# Patient Record
Sex: MALE | Race: BLACK OR AFRICAN AMERICAN | NOT HISPANIC OR LATINO | Employment: UNEMPLOYED | URBAN - METROPOLITAN AREA
[De-identification: names, ages, dates, MRNs, and addresses within clinical notes are randomized per-mention and may not be internally consistent; named-entity substitution may affect disease eponyms.]

---

## 2023-01-04 ENCOUNTER — TELEPHONE (OUTPATIENT)
Dept: PSYCHIATRY | Facility: CLINIC | Age: 33
End: 2023-01-04
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
## 2023-01-24 ENCOUNTER — SOCIAL WORK (OUTPATIENT)
Dept: BEHAVIORAL/MENTAL HEALTH CLINIC | Facility: CLINIC | Age: 33
End: 2023-01-24

## 2023-01-24 DIAGNOSIS — F43.9 TRAUMA AND STRESSOR-RELATED DISORDER: Primary | ICD-10-CM

## 2023-01-24 DIAGNOSIS — F32.A DEPRESSION, UNSPECIFIED DEPRESSION TYPE: ICD-10-CM
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
## 2023-01-25 ENCOUNTER — TELEPHONE (OUTPATIENT)
Dept: PSYCHIATRY | Facility: CLINIC | Age: 33
End: 2023-01-25

## 2023-01-25 NOTE — TELEPHONE ENCOUNTER
Pt called looking for med mgmt services  I informed pt that when he sees his therapist next to have them put in a referral and someone from the intake dept will reach out to get him scheduled

## 2023-02-07 ENCOUNTER — SOCIAL WORK (OUTPATIENT)
Dept: BEHAVIORAL/MENTAL HEALTH CLINIC | Facility: CLINIC | Age: 33
End: 2023-02-07

## 2023-02-07 DIAGNOSIS — F43.9 TRAUMA AND STRESSOR-RELATED DISORDER: ICD-10-CM

## 2023-02-07 DIAGNOSIS — F32.A DEPRESSION, UNSPECIFIED DEPRESSION TYPE: Primary | ICD-10-CM

## 2023-02-08 NOTE — PSYCH
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  Pt gets frustrated with maintaining relationships due to trust issues  Pt has seen family, friends and acquaintances change around him as he does well  Pt is quick to anger and wants to do more, to control his mood  During this session, this clinician used the following therapeutic modalities: Client-centered Therapy, Cognitive Behavioral Therapy and Motivational Interviewing    Substance Abuse was not addressed during this session  If the client is diagnosed with a co-occurring substance use disorder, please indicate any changes in the frequency or amount of use: na  Stage of change for addressing substance use diagnoses: No substance use/Not applicable    ASSESSMENT:  Arina Munoz presents with a Euthymic/ normal mood  his affect is Normal range and intensity, which is congruent, with his mood and the content of the session  The client has made progress on their goals  Liz Ramirez did not present as a danger to self or others  Arina Munoz presents with a none risk of suicide, none risk of self-harm, and none risk of harm to others  For any risk assessment that surpasses a "low" rating, a safety plan must be developed  A safety plan was indicated: no  If yes, describe in detail na    PLAN: Between sessions, Arina Munoz will reflect on his intrusive/negative thinking  Work to categorize, as best he can, the "buckets" that his various over-thinking falls into (I e  what themes/common denominators exist in his thinking)  At the next session, the therapist will use Client-centered Therapy and Cognitive Behavioral Therapy to address where the client is at      Behavioral Health Treatment Plan and Discharge Planning: Sánchez Beal is aware of and agrees to continue to work on their treatment plan  They have identified and are working toward their discharge goals  yes - Goals have been discussed, however both sessions have run long due to Pt's need to decompress his trauma and depression  Tx plan and Initial Eval details will be revisited on 2/21/23      Visit start and stop times:    02/08/23  Start Time: 1105  Stop Time: 1205  Total Visit Time: 60 minutes

## 2023-02-21 ENCOUNTER — SOCIAL WORK (OUTPATIENT)
Dept: BEHAVIORAL/MENTAL HEALTH CLINIC | Facility: CLINIC | Age: 33
End: 2023-02-21

## 2023-02-21 DIAGNOSIS — F32.A DEPRESSION, UNSPECIFIED DEPRESSION TYPE: Primary | ICD-10-CM

## 2023-02-21 DIAGNOSIS — F43.9 TRAUMA AND STRESSOR-RELATED DISORDER: ICD-10-CM

## 2023-02-21 NOTE — PSYCH
Behavioral Health Psychotherapy Progress Note    Psychotherapy Provided: Individual Psychotherapy     1  Depression, unspecified depression type        2  Trauma and stressor-related disorder            Goals addressed in session: Goal 1     DATA: Brodie Lopez was attentive, and engaged during the session  He mentioned again, his interest in being formally assessed for ADHD  Pt described how his mind wanders and that then triggers his anxiety when talking to people, as he loses tract of the conversation and becomes anxious when he doesn't know what to say  Pt talked about his trouble maintaining relationships, as everyone seems to want something from him  Pt mentioned his trust issues as a result of bad past experiences  During this session, this clinician used the following therapeutic modalities: Client-centered Therapy, Cognitive Behavioral Therapy, Mindfulness-based Strategies and Supportive Psychotherapy    Substance Abuse was not addressed during this session  If the client is diagnosed with a co-occurring substance use disorder, please indicate any changes in the frequency or amount of use: na  Stage of change for addressing substance use diagnoses: No substance use/Not applicable    ASSESSMENT:  Angle Strickland presents with a Euthymic/ normal mood  his affect is Normal range and intensity, which is congruent, with his mood and the content of the session  The client has made progress on their goals  Brodie Lopez did not present as a danger to self or others  Angle Strickland presents with a none risk of suicide, none risk of self-harm, and none risk of harm to others  For any risk assessment that surpasses a "low" rating, a safety plan must be developed  A safety plan was indicated: no  If yes, describe in detail na    PLAN: Between sessions, Angle Strickland will continue to reflect on his thought processes and communication stye   As we as evaluate his Fight, Flight and/or Freeze responses through an anxiety lens  At the next session, the therapist will use Client-centered Therapy and Cognitive Behavioral Therapy to address where the pt is at  Behavioral Health Treatment Plan and Discharge Planning: Isabelle Johnson is aware of and agrees to continue to work on their treatment plan  They have identified and are working toward their discharge goals   yes    Visit start and stop times:    02/21/23  Start Time: 1100  Stop Time: 1205  Total Visit Time: 65 minutes

## 2023-03-07 ENCOUNTER — SOCIAL WORK (OUTPATIENT)
Dept: BEHAVIORAL/MENTAL HEALTH CLINIC | Facility: CLINIC | Age: 33
End: 2023-03-07

## 2023-03-07 DIAGNOSIS — F43.9 TRAUMA AND STRESSOR-RELATED DISORDER: ICD-10-CM

## 2023-03-07 DIAGNOSIS — F32.A DEPRESSION, UNSPECIFIED DEPRESSION TYPE: Primary | ICD-10-CM

## 2023-03-07 NOTE — PSYCH
Behavioral Health Psychotherapy Progress Note    Psychotherapy Provided: Individual Psychotherapy     1  Depression, unspecified depression type        2  Trauma and stressor-related disorder            Goals addressed in session: Goal 1     DATA: Lexi Rust was in good spirits  Discussion revolved mainly around navigating his relationships  He perceives other people's motives through his trauma and has a lot of anxiety about how to manage other people's expectations, and struggles with trust    During this session, this clinician used the following therapeutic modalities: Client-centered Therapy, Cognitive Behavioral Therapy and Motivational Interviewing    Substance Abuse was not addressed during this session  If the client is diagnosed with a co-occurring substance use disorder, please indicate any changes in the frequency or amount of use: na  Stage of change for addressing substance use diagnoses: No substance use/Not applicable    ASSESSMENT:  Herminio Boateng presents with a Euthymic/ normal mood  his affect is Normal range and intensity, which is congruent, with his mood and the content of the session  The client has made progress on their goals  Lexi Rust did not present as a danger to self or others  Herminio Boateng presents with a minimal risk of suicide, minimal risk of self-harm, and minimal risk of harm to others  For any risk assessment that surpasses a "low" rating, a safety plan must be developed  A safety plan was indicated: no  If yes, describe in detail na    PLAN: Between sessions, Herminio Boateng will reflect on his next steps in life  He has many plans and needs to work on goal setting  At the next session, the therapist will use Client-centered Therapy to address where the pt is at  Behavioral Health Treatment Plan and Discharge Planning: Herminio Boateng is aware of and agrees to continue to work on their treatment plan   They have identified and are working toward their discharge goals   yes    Visit start and stop times:    03/07/23  Start Time: 1100  Stop Time: 1205  Total Visit Time: 65 minutes

## 2023-03-16 ENCOUNTER — TELEMEDICINE (OUTPATIENT)
Dept: PSYCHIATRY | Facility: CLINIC | Age: 33
End: 2023-03-16

## 2023-03-16 ENCOUNTER — TELEPHONE (OUTPATIENT)
Dept: PSYCHIATRY | Facility: CLINIC | Age: 33
End: 2023-03-16

## 2023-03-16 DIAGNOSIS — F31.9 BIPOLAR AFFECTIVE DISORDER, REMISSION STATUS UNSPECIFIED (HCC): Primary | ICD-10-CM

## 2023-03-16 DIAGNOSIS — F12.10 CANNABIS ABUSE: ICD-10-CM

## 2023-03-16 RX ORDER — LAMOTRIGINE 25 MG/1
25 TABLET ORAL DAILY
Qty: 30 TABLET | Refills: 1 | Status: SHIPPED | OUTPATIENT
Start: 2023-03-16

## 2023-03-16 RX ORDER — BUPROPION HYDROCHLORIDE 300 MG/1
300 TABLET ORAL DAILY
COMMUNITY
Start: 2023-02-24

## 2023-03-16 RX ORDER — PANTOPRAZOLE SODIUM 40 MG/1
40 TABLET, DELAYED RELEASE ORAL DAILY
COMMUNITY
Start: 2023-01-23

## 2023-03-16 NOTE — PATIENT INSTRUCTIONS
ADHD - neuropsychological testing   Considering h/o severe drug abuse, NO stimulants  - if dx of ADHD is confirmed - only strattera or wellbutrin  I explained long term effects on MJ use - anxiety, cognitive impairment - recommended to cut down - pt is not very interested    Continue wellbutrin by PCO  Add lamictal 25 mg qd

## 2023-03-16 NOTE — TELEPHONE ENCOUNTER
"Good Morning Daphne Dario- We are looking forward to your appointment today  There is necessary paperwork that must be read, signed and completed prior to your Virtual Visit with Dr Nat Macias today, 3/16/2023, at 5:00pm   We will check back 30-60 minutes prior to your appointment to make sure the paperwork is complete; otherwise, we may have to temporarily cancel this appointment until the paperwork is completed and signed  Please call us at [phone] if you need assistance- we are here to help  You can sign the paperwork by registering for [employer]), and completing the forms online digitally, or you can print the attached document, complete, sign and resend it to us through email or fax  You can reply to this email and attach file, or fax it to us at [phone]  If you decide to fax or email, please also send a copy of your ’s license so we can confirm your identity  Again, please call us at [phone] if you have any questions or concerns  If you are having trouble locating your documents in [employer]), please see the attached how to [facility] guide  If you need help setting up [employer]), instructions on how to register are also attached  You can also call [phone] ([phone]), select option 5, or visit our [address] website online at [facility]/[employer] and they will have a specialist walk you through the steps      "
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
## 2023-04-04 ENCOUNTER — SOCIAL WORK (OUTPATIENT)
Dept: BEHAVIORAL/MENTAL HEALTH CLINIC | Facility: CLINIC | Age: 33
End: 2023-04-04

## 2023-04-04 DIAGNOSIS — F32.A DEPRESSION, UNSPECIFIED DEPRESSION TYPE: Primary | ICD-10-CM

## 2023-04-04 DIAGNOSIS — F43.9 TRAUMA AND STRESSOR-RELATED DISORDER: ICD-10-CM

## 2023-04-04 NOTE — PSYCH
"Behavioral Health Psychotherapy Progress Note    Psychotherapy Provided: Individual Psychotherapy     1  Depression, unspecified depression type        2  Trauma and stressor-related disorder            Goals addressed in session: Goal 1     DATA: Nik Garcia was connected to Psychiatry and disclosed he is taking his meds regularly, and has already seen an effect  He states that he is less anxious, his thoughts are \"clearer\" and his \"emotions are stabilized\"  Nik Garcia agreed that he would like to work on communication skills, being assertive, and learning how to navigate difficult conversations and relationships  During this session, this clinician used the following therapeutic modalities: Client-centered Therapy, Cognitive Behavioral Therapy and Mindfulness-based Strategies    Substance Abuse was not addressed during this session  If the client is diagnosed with a co-occurring substance use disorder, please indicate any changes in the frequency or amount of use: na  Stage of change for addressing substance use diagnoses: No substance use/Not applicable    ASSESSMENT:  Varun Mittal presents with a Euthymic/ normal mood  his affect is Normal range and intensity, which is congruent, with his mood and the content of the session  The client has made progress on their goals  Nik Garcia did not present as a danger to self or others  Varun Mittal presents with a minimal risk of suicide, minimal risk of self-harm, and minimal risk of harm to others  For any risk assessment that surpasses a \"low\" rating, a safety plan must be developed  A safety plan was indicated: no  If yes, describe in detail na    PLAN: Between sessions, Varun Mittal will reflect on circumstances  At the next session, the therapist will use Client-centered Therapy to address where the client is at      Behavioral Health Treatment Plan and Discharge Planning: Varun Mittal is aware of and agrees to continue to work on their treatment " plan  They have identified and are working toward their discharge goals   yes    Visit start and stop times:    04/05/23  Start Time: 1100  Stop Time: 1152  Total Visit Time: 52 minutes

## 2023-04-04 NOTE — BH TREATMENT PLAN
[address]  1990     Date of Initial Psychotherapy Assessment: 4/4/23   Date of Current Treatment Plan: 04/05/23  Treatment Plan Target Date: TBD  Treatment Plan Expiration Date: 10/24/23    Diagnosis:   1  Depression, unspecified depression type        2  Trauma and stressor-related disorder            Area(s) of Need: Coping skill set  Establishing/Maintaining healthy boundaries  Long Term Goal 1 (in the client's own words): Self improvement  Better communication  Understanding my emotions    Stage of Change: Action    Target Date for completion: TBD     Anticipated therapeutic modalities: CBT  Mindfulness  Positive Psychology  Client Centered Therapy  People identified to complete this goal: Lucia Jones      Objective 1: (identify the means of measuring success in meeting the objective): Bringing out the best version of my self in all aspects  Objective 2: (identify the means of measuring success in meeting the objective): Healthier conversations  Better experiences with people  I am currently under the care of a [facility] psychiatric provider: yes    My [facility] psychiatric provider is: Attila Minus am currently taking psychiatric medications: Yes, as prescribed    I feel that I will be ready for discharge from mental health care when I reach the following (measurable goal/objective): See goals above  For children and adults who have a legal guardian:   Has there been any change to custody orders and/or guardianship status? NA  If yes, attach updated documentation  I have created my Crisis Plan and have been offered a copy of this plan    [address]: Diagnosis and Treatment Plan explained to Robbi Saldana acknowledges an understanding of their diagnosis  Lucia Jones agrees to this treatment plan      I have been offered a copy of this Treatment Plan  yes

## 2023-04-04 NOTE — BH CRISIS PLAN
"Client Name: Denisse Aguirre       Client YOB: 1990  : 1990    Treatment Team (include name and contact information):     Psychotherapist: Stevan Rojsa    Psychiatrist: Duran Gloria   Release of information completed: no    \" na   Release of information completed: no    Healthcare Provider  No primary care provider on file  No primary provider on file  Type of Plan   * Child plans (children 15 yo and younger) must be completed and signed by the child's legal guardian   * Plans for all individuals 15 yo and above must be signed by the client  Plan Type: adolescent/adult (14 and over) Initial      My Personal Strengths are (in the client's own words):  Self dependent determined diligent    The stressors and triggers that may put me at risk are:  other (describe) Betrayal, Verabal abuse    Coping skills I can use to keep myself calm and safe:  [place]/meditate, Smoke  Coping skills/supports I can use to maintain abstinence from substance use:   na    The people that provide me with help and support: (Include name, contact, and how they can help)   Support person #1: Mom    * Phone number: [phone]    * How can they help me? Every way possible  In the past, the following has helped me in times of crisis:    Calling a friend, Calling a family member, Praying or meditating and Listening to music      If it is an emergency and you need immediate help, call     If there is a possibility of danger to yourself or others, call the following crisis hotline resources:     Adult Crisis Numbers  Suicide Prevention Hotline - Dial   [facility]: [address]: [address]: [address]: [address] [place] [address] Avenue: [address] [place] [address] Street: [address] [place] [address] Avenue: [address] [place] [address] St: [phone] (daytime)         [phone] (after hours, weekends, " holidays)     Child/Adolescent Crisis Numbers   [facility]: Julianna Hurtado 10: [phone]   [place]: [phone]   [address] [place] [address] ([facility]): [phone]    Please note: Some counties do not have a separate number for Child/Adolescent specific crisis  If your county is not listed under Child/Adolescent, please call the adult number for your county     National Talk to Text Line   All Ages - [phone]    In the event your feelings become unmanageable, and you cannot reach your support system, you will call 911 immediately or go to the nearest hospital emergency room

## 2023-05-09 ENCOUNTER — SOCIAL WORK (OUTPATIENT)
Dept: BEHAVIORAL/MENTAL HEALTH CLINIC | Facility: CLINIC | Age: 33
End: 2023-05-09

## 2023-05-09 DIAGNOSIS — F43.9 TRAUMA AND STRESSOR-RELATED DISORDER: ICD-10-CM

## 2023-05-09 DIAGNOSIS — F32.A DEPRESSION, UNSPECIFIED DEPRESSION TYPE: Primary | ICD-10-CM

## 2023-05-09 NOTE — PSYCH
"Behavioral Health Psychotherapy Progress Note    Psychotherapy Provided: Individual Psychotherapy     1  Depression, unspecified depression type        2  Trauma and stressor-related disorder            Goals addressed in session: Goal 1     DATA: Brian Francis mentioned his ongoing struggles with trusting the family and friends that he grew up with  Stating he's been \"hurt\" too often  During this session, this clinician used the following therapeutic modalities: Client-centered Therapy, Mindfulness-based Strategies and Motivational Interviewing    Substance Abuse was addressed during this session  If the client is diagnosed with a co-occurring substance use disorder, please indicate any changes in the frequency or amount of use: na  Stage of change for addressing substance use diagnoses: Maintenance    ASSESSMENT:  Katarzyna Mcclelland presents with a Euthymic/ normal mood  his affect is Normal range and intensity, which is congruent, with his mood and the content of the session  The client has made progress on their goals  Brian Francis did not present as a danger to self or others  Katarzyna Mcclelland presents with a minimal risk of suicide, minimal risk of self-harm, and minimal risk of harm to others  For any risk assessment that surpasses a \"low\" rating, a safety plan must be developed  A safety plan was indicated: no  If yes, describe in detail na    PLAN: Between sessions, Katarzyna Mcclelland will reflect on his goals, boundaries and barriers to self-care  At the next session, the therapist will use Client-centered Therapy to address where the client is at  Behavioral Health Treatment Plan and Discharge Planning: [facility] is aware of and agrees to continue to work on their treatment plan  They have identified and are working toward their discharge goals   yes    Visit start and stop times:    05/09/23  Start Time: 1105  Stop Time: 1155  Total Visit Time: 50 minutes  "

## 2023-05-23 ENCOUNTER — TELEMEDICINE (OUTPATIENT)
Dept: PSYCHIATRY | Facility: CLINIC | Age: 33
End: 2023-05-23

## 2023-05-23 DIAGNOSIS — F12.10 CANNABIS ABUSE: ICD-10-CM

## 2023-05-23 DIAGNOSIS — F31.9 BIPOLAR AFFECTIVE DISORDER, REMISSION STATUS UNSPECIFIED (HCC): Primary | ICD-10-CM

## 2023-05-23 RX ORDER — LAMOTRIGINE 25 MG/1
50 TABLET ORAL DAILY
Qty: 60 TABLET | Refills: 2 | Status: SHIPPED | OUTPATIENT
Start: 2023-05-23

## 2023-05-23 NOTE — PSYCH
"  Virtual Regular Visit    Verification of patient location:    Patient is located at Home in the following state in which I hold an active license [place]      Assessment/Plan:    Problem List Items Addressed This Visit    None      Goals addressed in session: Goal 1          Reason for visit is   Chief Complaint   Patient presents with   • Medication Management        Encounter provider Dena Snow MD    Provider located at [id number] [facility] [address]  [address]  [phone]      Recent Visits  No visits were found meeting these conditions  Showing recent visits within past 7 days and meeting all other requirements  Today's Visits  Date Type Provider Dept   05/23/23 Telemedicine Dena Snow, [address] [address] today's visits and meeting all other requirements  Future Appointments  No visits were found meeting these conditions  Showing future appointments within next 150 days and meeting all other requirements       The patient was identified by name and date of birth  Miladis Reid was informed that this is a telemedicine visit and that the visit is being conducted throughthe [facility]  He agrees to proceed     My office door was closed  No one else was in the room  He acknowledged consent and understanding of privacy and security of the video platform  The patient has agreed to participate and understands they can discontinue the visit at any time  Patient is aware this is a billable service  Fabian Jose Elias is a 33 y o  male with bipolar do presents for regular f/u   He looks \" high\" - was smoking weed just prior appt  He reports being compliant with meds   HPI   Mood - reports as \" good\"  - denies mood swings or depression  Anxiety - controlled; denies panic attacks or racing thoughts    History reviewed  No pertinent past medical history  History reviewed   No pertinent " surgical history  Current Outpatient Medications   Medication Sig Dispense Refill   • buPROPion (WELLBUTRIN XL) 300 mg 24 hr tablet Take 300 mg by mouth daily     • lamoTRIgine (LaMICtal) 25 mg tablet Take 2 tablets (50 mg total) by mouth daily 60 tablet 1   • pantoprazole (PROTONIX) 40 mg tablet Take 40 mg by mouth daily       No current facility-administered medications for this visit  Not on File    Review of Systems   Constitutional: Negative for activity change and appetite change  Psychiatric/Behavioral: Negative for dysphoric mood, sleep disturbance and suicidal ideas  The patient is not nervous/anxious  Video Exam    There were no vitals filed for this visit  Physical Exam  Constitutional:       Appearance: Normal appearance  He is normal weight  Comments: High on weed   Neurological:      Mental Status: He is alert  Psychiatric:         Attention and Perception: Attention and perception normal          Mood and Affect: Affect normal  Mood is elated  Speech: Speech normal          Behavior: Behavior normal  Behavior is cooperative  Thought Content:  Thought content normal          Judgment: Judgment normal       Comments: High on weed          Visit Time    Visit Start Time: 3 58 pm   Visit Stop Time: 4 06 pm   Total Visit Duration: 15 minutes

## 2023-06-16 ENCOUNTER — TELEPHONE (OUTPATIENT)
Dept: PSYCHIATRY | Facility: CLINIC | Age: 33
End: 2023-06-16

## 2023-06-16 NOTE — TELEPHONE ENCOUNTER
Called patient to cancel appt for 6/20 because provider is out of town  Pt will koffi ricky to reschedule

## 2023-06-28 DIAGNOSIS — F31.9 BIPOLAR AFFECTIVE DISORDER, REMISSION STATUS UNSPECIFIED (HCC): Primary | ICD-10-CM

## 2023-06-28 RX ORDER — PANTOPRAZOLE SODIUM 40 MG/1
40 TABLET, DELAYED RELEASE ORAL DAILY
Qty: 30 TABLET | Refills: 1 | Status: SHIPPED | OUTPATIENT
Start: 2023-06-28

## 2023-06-28 RX ORDER — BUPROPION HYDROCHLORIDE 300 MG/1
300 TABLET ORAL DAILY
Qty: 30 TABLET | Refills: 1 | Status: SHIPPED | OUTPATIENT
Start: 2023-06-28

## 2023-07-25 ENCOUNTER — SOCIAL WORK (OUTPATIENT)
Dept: BEHAVIORAL/MENTAL HEALTH CLINIC | Facility: CLINIC | Age: 33
End: 2023-07-25
Payer: COMMERCIAL

## 2023-07-25 DIAGNOSIS — F43.9 TRAUMA AND STRESSOR-RELATED DISORDER: ICD-10-CM

## 2023-07-25 DIAGNOSIS — F32.A DEPRESSION, UNSPECIFIED DEPRESSION TYPE: Primary | ICD-10-CM

## 2023-07-25 PROCEDURE — 90834 PSYTX W PT 45 MINUTES: CPT

## 2023-07-25 NOTE — PSYCH
Behavioral Health Psychotherapy Progress Note    Psychotherapy Provided: Individual Psychotherapy     1. Depression, unspecified depression type        2. Trauma and stressor-related disorder            Goals addressed in session: Goal 1     DATA: Saw son from [place], had to go back. Was tough. Leaning towards clothing, pausing music. Too violent, not enough money. Starting a new job. Telemarketing, selling solar panels. Having difficulties with family/brothers helping with career and role confusion. GF checked in to [facility]. PT was supportive. Discussed the loss of music laptop. Spilled water on it. Tough loss. During this session, this clinician used the following therapeutic modalities: Client-centered Therapy    Substance Abuse was not addressed during this session. If the client is diagnosed with a co-occurring substance use disorder, please indicate any changes in the frequency or amount of use: na. Stage of change for addressing substance use diagnoses: No substance use/Not applicable    ASSESSMENT:  Marie Torres presents with a Euthymic/ normal mood. his affect is Normal range and intensity, which is congruent, with his mood and the content of the session. The client has made progress on their goals. Earnesteen Dys did not present as a danger to self or others. Marie Torres presents with a minimal risk of suicide, minimal risk of self-harm, and minimal risk of harm to others. For any risk assessment that surpasses a "low" rating, a safety plan must be developed. A safety plan was indicated: no  If yes, describe in detail na    PLAN: Between sessions, Marie Torres will work on maintaining healthy boundaries. At the next session, the therapist will use Client-centered Therapy to address where the client is at. Behavioral Health Treatment Plan and Discharge Planning: Marie Torres is aware of and agrees to continue to work on their treatment plan.  They have identified and are working toward their discharge goals.  yes    Visit start and stop times:    07/25/23  Start Time: 1006  Stop Time: 1058  Total Visit Time: 52 minutes

## 2023-08-31 ENCOUNTER — TELEMEDICINE (OUTPATIENT)
Dept: PSYCHIATRY | Facility: CLINIC | Age: 33
End: 2023-08-31
Payer: COMMERCIAL

## 2023-08-31 DIAGNOSIS — F31.9 BIPOLAR AFFECTIVE DISORDER, REMISSION STATUS UNSPECIFIED (HCC): Primary | ICD-10-CM

## 2023-08-31 DIAGNOSIS — F12.10 CANNABIS ABUSE: ICD-10-CM

## 2023-08-31 PROCEDURE — 99214 OFFICE O/P EST MOD 30 MIN: CPT | Performed by: PSYCHIATRY & NEUROLOGY

## 2023-08-31 RX ORDER — LAMOTRIGINE 100 MG/1
100 TABLET ORAL DAILY
Qty: 30 TABLET | Refills: 1 | Status: SHIPPED | OUTPATIENT
Start: 2023-08-31

## 2023-08-31 RX ORDER — BUPROPION HYDROCHLORIDE 300 MG/1
300 TABLET ORAL DAILY
Qty: 30 TABLET | Refills: 1 | Status: SHIPPED | OUTPATIENT
Start: 2023-08-31

## 2023-08-31 NOTE — PSYCH
Virtual Regular Visit    Verification of patient location:    Patient is located at Home in the following state in which I hold an active license [place]      Assessment/Plan:    Problem List Items Addressed This Visit    None      Goals addressed in session: Goal 1          Reason for visit is   Chief Complaint   Patient presents with   • Medication Management        Encounter provider Anthony Berger MD    Provider located at [address] [place] [address],[address]  [address]  [phone]      Recent Visits  No visits were found meeting these conditions. Showing recent visits within past 7 days and meeting all other requirements  Today's Visits  Date Type Provider Dept   08/31/23 Telemedicine Anthony Berger, [address] today's visits and meeting all other requirements  Future Appointments  No visits were found meeting these conditions. Showing future appointments within next 150 days and meeting all other requirements       The patient was identified by name and date of birth. John Dailey was informed that this is a telemedicine visit and that the visit [facility]. He agrees to proceed. .  My office door was closed. No one else was in the room. He acknowledged consent and understanding of privacy and security of the video platform. The patient has agreed to participate and understands they can discontinue the visit at any time. Patient is aware this is a billable service. Subjective  John Dailey is a 29 y.o. male with bipolar do presents for regular f/u   compliant with meds. 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       HPI   Mood -pt is talkative, but poor historian; needs redirections and interruptions  He reports feeling " good" and sometimes getting frustrated of " things not done", which make him depressed, hopeless, low self esteem, worthless, negative intrusive thoughts  Gets more energy at night, creative, stays up late and sleeps 3-4 hrs  Less angry  Anxiety - racing thoughts and worries   History reviewed. No pertinent past medical history. History reviewed. No pertinent surgical history. Current Outpatient Medications   Medication Sig Dispense Refill   • buPROPion (WELLBUTRIN XL) 300 mg 24 hr tablet Take 1 tablet (300 mg total) by mouth daily 30 tablet 1   • lamoTRIgine (LaMICtal) 25 mg tablet Take 2 tablets (50 mg total) by mouth daily 60 tablet 2   • pantoprazole (PROTONIX) 40 mg tablet Take 1 tablet (40 mg total) by mouth daily 30 tablet 1     No current facility-administered medications for this visit. Not on File    Review of Systems   Constitutional: Negative for activity change and appetite change. Psychiatric/Behavioral: Positive for dysphoric mood and sleep disturbance. Negative for suicidal ideas. The patient is nervous/anxious. Video Exam    There were no vitals filed for this visit. Physical Exam  Constitutional:       Appearance: Normal appearance. He is normal weight. Neurological:      Mental Status: He is alert. Psychiatric:         Attention and Perception: Attention and perception normal.         Mood and Affect: Mood is anxious. Affect is blunt. Behavior: Behavior normal. Behavior is cooperative. Thought Content:  Thought content normal.         Judgment: Judgment normal.      Comments: Talkative, but poor historian  Needs interruptions and redirections  Circumstantial and sometimes irrelevant          Visit Time    Visit Start Time: 2.58 pm   Visit Stop Time: 3.06 pm   Total Visit Duration: 17 minutes

## 2023-09-11 DIAGNOSIS — F31.9 BIPOLAR AFFECTIVE DISORDER, REMISSION STATUS UNSPECIFIED (HCC): ICD-10-CM

## 2023-09-11 RX ORDER — PANTOPRAZOLE SODIUM 40 MG/1
40 TABLET, DELAYED RELEASE ORAL DAILY
Qty: 30 TABLET | Refills: 1 | OUTPATIENT
Start: 2023-09-11

## 2023-09-12 ENCOUNTER — SOCIAL WORK (OUTPATIENT)
Dept: BEHAVIORAL/MENTAL HEALTH CLINIC | Facility: CLINIC | Age: 33
End: 2023-09-12
Payer: COMMERCIAL

## 2023-09-12 DIAGNOSIS — F43.9 TRAUMA AND STRESSOR-RELATED DISORDER: ICD-10-CM

## 2023-09-12 DIAGNOSIS — F32.A DEPRESSION, UNSPECIFIED DEPRESSION TYPE: Primary | ICD-10-CM

## 2023-09-12 PROCEDURE — 90834 PSYTX W PT 45 MINUTES: CPT

## 2023-09-12 NOTE — PSYCH
Behavioral Health Psychotherapy Progress Note    Psychotherapy Provided: Individual Psychotherapy     1. Depression, unspecified depression type        2. Trauma and stressor-related disorder            Goals addressed in session: Goal 1     DATA: Tu Been expressed his frustrations with his situation. Finances are a concern. Pt shared his struggles with anger and his history using anger as a survival skill. During this session, this clinician used the following therapeutic modalities: Client-centered Therapy, Mindfulness-based Strategies and Solution-Focused Therapy    Substance Abuse was addressed during this session. If the client is diagnosed with a co-occurring substance use disorder, please indicate any changes in the frequency or amount of use: na. Stage of change for addressing substance use diagnoses: No substance use/Not applicable    ASSESSMENT:  Umesh Broussard presents with a Angry and Anxious mood. his affect is Normal range and intensity, which is congruent, with his mood and the content of the session. The client has not made progress on their goals. Tu Been did not present as a danger to self or others, however topic of today was his anger management, and his increasing frustration. Pt noted he sometimes feels like he's going to lose control. Umesh Broussard presents with a minimal risk of suicide, minimal risk of self-harm, and minimal risk of harm to others. For any risk assessment that surpasses a "low" rating, a safety plan must be developed. A safety plan was indicated: no  If yes, describe in detail na    PLAN: Between sessions, Umesh Broussard will work on identifying goals and problem solving. At the next session, the therapist will use Client-centered Therapy to address where the client is at. Behavioral Health Treatment Plan and Discharge Planning: Umesh Broussard is aware of and agrees to continue to work on their treatment plan.  They have identified and are working toward their discharge goals.  yes    Visit start and stop times:    09/12/23  Start Time: 1102  Stop Time: 1154  Total Visit Time: 52 minutes

## 2023-11-08 ENCOUNTER — TELEPHONE (OUTPATIENT)
Dept: PSYCHIATRY | Facility: CLINIC | Age: 33
End: 2023-11-08

## 2023-11-08 NOTE — TELEPHONE ENCOUNTER
Clt calling to schedule f/u with Janusz and Dr. Kiley Mayes. Scheduled client with Alec Nolan on 11/14 at 10am in person and transferred call to [facility] to assist with scheduling with Dr. Kiley Mayes.

## 2023-11-13 ENCOUNTER — TELEMEDICINE (OUTPATIENT)
Dept: PSYCHIATRY | Facility: CLINIC | Age: 33
End: 2023-11-13
Payer: COMMERCIAL

## 2023-11-13 DIAGNOSIS — G47.00 INSOMNIA, UNSPECIFIED TYPE: Primary | ICD-10-CM

## 2023-11-13 DIAGNOSIS — F31.9 BIPOLAR AFFECTIVE DISORDER, REMISSION STATUS UNSPECIFIED (HCC): ICD-10-CM

## 2023-11-13 PROCEDURE — 99214 OFFICE O/P EST MOD 30 MIN: CPT | Performed by: PSYCHIATRY & NEUROLOGY

## 2023-11-13 RX ORDER — LAMOTRIGINE 100 MG/1
100 TABLET ORAL DAILY
Qty: 30 TABLET | Refills: 1 | Status: SHIPPED | OUTPATIENT
Start: 2023-11-13

## 2023-11-13 RX ORDER — TRAZODONE HYDROCHLORIDE 100 MG/1
100 TABLET ORAL
Qty: 30 TABLET | Refills: 1 | Status: SHIPPED | OUTPATIENT
Start: 2023-11-13

## 2023-11-13 RX ORDER — BUPROPION HYDROCHLORIDE 300 MG/1
300 TABLET ORAL DAILY
Qty: 30 TABLET | Refills: 1 | Status: SHIPPED | OUTPATIENT
Start: 2023-11-13

## 2023-11-13 NOTE — PSYCH
Virtual Regular Visit    Verification of patient location:    Patient is located at Home in the following state in which I hold an active license [place]      Assessment/Plan:    Problem List Items Addressed This Visit    None  Visit Diagnoses       Bipolar affective disorder, remission status unspecified ([address] )                Goals addressed in session: Goal 1          Reason for visit is   Chief Complaint   Patient presents with    Medication Management        Encounter provider Luis Brown MD    Provider located at [address] [place] [address],[address]  [address]  [phone]      Recent Visits  Date Type Provider Dept   11/08/23 Telephone Provider Miguelito Flores recent visits within past 7 days and meeting all other requirements  Today's Visits  Date Type Provider Dept   11/13/23 Telemedicine Luis Brown, [address] today's visits and meeting all other requirements  Future Appointments  No visits were found meeting these conditions. Showing future appointments within next 150 days and meeting all other requirements       The patient was identified by name and date of birth. Sudheer Christopher was informed that this is a telemedicine visit and [facility] He agrees to proceed. .  My office door was closed. No one else was in the room. He acknowledged consent and understanding of privacy and security of the video platform. The patient has agreed to participate and understands they can discontinue the visit at any time. Patient is aware this is a billable service. Subjective  Sudheer Christopher is a 29 y.o. male with bipolar do and anxiety presents for regular f/u  .   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 Denies mood swings or depression  Talkative  Anxiety - racing thoughts and worries " all the time"  Sleep - 3-4 hrs; stays up late, creating  History reviewed. No pertinent past medical history. History reviewed. No pertinent surgical history. Current Outpatient Medications   Medication Sig Dispense Refill    buPROPion (WELLBUTRIN XL) 300 mg 24 hr tablet Take 1 tablet (300 mg total) by mouth daily 30 tablet 1    lamoTRIgine (LaMICtal) 100 mg tablet Take 1 tablet (100 mg total) by mouth daily 30 tablet 1    pantoprazole (PROTONIX) 40 mg tablet Take 1 tablet (40 mg total) by mouth daily 30 tablet 1     No current facility-administered medications for this visit. Not on File    Review of Systems   Constitutional:  Positive for activity change (increased). Negative for appetite change. Psychiatric/Behavioral:  Positive for sleep disturbance. Negative for suicidal ideas. The patient is nervous/anxious and is hyperactive. Video Exam    There were no vitals filed for this visit. Physical Exam  Constitutional:       Appearance: Normal appearance. He is normal weight. Comments: Seems "high"   Neurological:      Mental Status: He is alert. Psychiatric:         Attention and Perception: Attention and perception normal.         Mood and Affect: Mood is anxious. Affect is blunt. Speech: Speech is tangential.         Behavior: Behavior normal. Behavior is cooperative.          Judgment: Judgment normal.      Comments: Circumstantial and tangential; needs redirections  Slightly disorganized          Visit Time    Visit Start Time: 3.55 pm   Visit Stop Time: 4.10 pm   Total Visit Duration:  20 minutes  TREATMENT PLAN (Medication Management Only)        [facility]    Name and Date of Birth:  Ivelisse Glover 29 y.o. 1990  Date of Treatment Plan: November 13, 2023  Diagnosis/Diagnoses:    1. Bipolar affective disorder, remission status unspecified ([address])      Strengths/Personal Resources for Self-Care: taking medications as prescribed. Area/Areas of need (in own words): anxiety, mood swings, sleep problems  1. Long Term Goal: improve manic symptoms. Target Date:6 months - 5/13/2024  Person/Persons responsible for completion of goal: Scout LOCKE  2. Short Term Objective (s) - How will we reach this goal?:   A. Provider new recommended medication/dosage changes and/or continue medication(s): continue current medications as prescribed Wellbutrin XL, Trazodone, Lamictal.  B. N/A.  C. N/A. Target Date:6 months - 5/13/2024  Person/Persons Responsible for Completion of Goal: Scout LOCKE  Progress Towards Goals: continuing treatment  Treatment Modality: medication management every 3 months  Review due 180 days from date of this plan: 6 months - 5/13/2024  Expected length of service: ongoing treatment  My Physician/PA/NP and I have developed this plan together and I agree to work on the goals and objectives. I understand the treatment goals that were developed for my treatment.

## 2023-11-14 ENCOUNTER — SOCIAL WORK (OUTPATIENT)
Dept: BEHAVIORAL/MENTAL HEALTH CLINIC | Facility: CLINIC | Age: 33
End: 2023-11-14
Payer: COMMERCIAL

## 2023-11-14 DIAGNOSIS — F32.A DEPRESSION, UNSPECIFIED DEPRESSION TYPE: Primary | ICD-10-CM

## 2023-11-14 DIAGNOSIS — R45.4 ANGER: ICD-10-CM

## 2023-11-14 DIAGNOSIS — F43.9 TRAUMA AND STRESSOR-RELATED DISORDER: ICD-10-CM

## 2023-11-14 PROCEDURE — 90834 PSYTX W PT 45 MINUTES: CPT

## 2023-11-14 NOTE — PSYCH
Treatment Plan Tracking    # 2Treatment Plan not completed within required time limits due to:  Pt had graduated from biweekly sessions, and returns now before discharged from absence. Pt had been told to call for an appt as needed . Behavioral Health Psychotherapy Progress Note    Psychotherapy Provided: Individual Psychotherapy     1. Depression, unspecified depression type        2. Trauma and stressor-related disorder        3. 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 Pt still struggles with negotiating relationships as everyone seems to perceive the Pt as a short-cut to their own success. This weighs heavily on Pt, as he's also struggled with anger management, and doesn't want to become "that person" when he gets frustrated with folks. During this session, this clinician used the following therapeutic modalities: Supportive Psychotherapy    Substance Abuse was not addressed during this session. If the client is diagnosed with a co-occurring substance use disorder, please indicate any changes in the frequency or amount of use: na. Stage of change for addressing substance use diagnoses: No substance use/Not applicable    ASSESSMENT:  Kishan Harry presents with a Euthymic/ normal mood. his affect is Normal range and intensity, which is congruent, with his mood and the content of the session. The client has made progress on their goals. Carolin Campbell did not present as a danger to self or others. Kishan Harry presents with a minimal risk of suicide, minimal risk of self-harm, and minimal risk of harm to others. For any risk assessment that surpasses a "low" rating, a safety plan must be developed.     A safety plan was indicated: no  If yes, describe in detail na    PLAN: Between sessions, Kishan Harry will work to establish/maintain healthy boundaries. Will work on goal setting. At the next session, the therapist will use Client-centered Therapy to address where the client is at. Behavioral Health Treatment Plan and Discharge Planning: Marten Sacks is aware of and agrees to continue to work on their treatment plan. They have identified and are working toward their discharge goals.  yes    Visit start and stop times:    11/14/23  Start Time: 1005  Stop Time: 1057  Total Visit Time: 52 minutes

## 2023-11-14 NOTE — BH CRISIS PLAN
Client Name: Darren Monte       Client YOB: 1990  : 1990    Treatment Team (include name and contact information):     Psychotherapist: Karina Mccallum    Psychiatrist: Tom Wesis   Release of information completed: no    Healthcare Provider  No primary care provider on file. No primary provider on file. Type of Plan   * Child plans (children 15 yo and younger) must be completed and signed by the child's legal guardian   * Plans for all individuals 15 yo and above must be signed by the client. Plan Type: adolescent/adult (15 and over) Initial      My Personal Strengths are (in the client's own words):  "Motivated, Inspired, Hard Working, Creative"  The stressors and triggers that may put me at risk are:  chronic anxiety, difficulty with anger management, relationship problems, and stress at work    Coping skills I can use to keep myself calm and safe: Other (describe) Smoke Weed and Other (describe)Do work. Coping skills/supports I can use to maintain abstinence from substance use:   keeping myself busy at work and Medical Marijuana     The people that provide me with help and support: (Include name, contact, and how they can help)   Support person #1: Mom     * Phone number: in cell phone    * How can they help me? Everything     In the past, the following has helped me in times of crisis:    Calling a family member      If it is an emergency and you need immediate help, call     If there is a possibility of danger to yourself or others, call the following crisis hotline resources:     Adult Crisis Numbers  Suicide Prevention Hotline - Dial   [facility]: [address] [place] [address] Street: [address] [facility] [address]: [address] [facility] [address]: [address]: [address]: [address]: [address]: [phone] (daytime).        [phone] (after hours, weekends, holidays)     Child/Adolescent Crisis Numbers   formerly [facility]: [address]: [phone]   [place]: [phone]   [facility]: [phone]    Please note: Some [facility] do not have a separate number for Child/Adolescent specific crisis. If your county is not listed under Child/Adolescent, please call the adult number for your county     National Talk to Text Line   [employer] - [phone]    In the event your feelings become unmanageable, and you cannot reach your support system, you will call 911 immediately or go to the nearest hospital emergency room.

## 2023-11-14 NOTE — BH TREATMENT PLAN
[facility]  1990     Date of Initial Psychotherapy Assessment: 4/4/23   Date of Current Treatment Plan: 11/14/23  Treatment Plan Target Date: TBD  Treatment Plan Expiration Date: 4/23/23    Diagnosis:   1. Depression, unspecified depression type        2. Trauma and stressor-related disorder        3. Anger            Area(s) of Need: Coping skill set. Establishing/Maintaining healthy boundaries. Long Term Goal 1 (in the client's own words):  Self improvement. Better communication. Understanding my emotions     Stage of Change: Action    Target Date for completion: TBD    Anticipated therapeutic modalities: CBT. Mindfulness. Positive Psychology. Client Centered Therapy. People identified to complete this goal: Chalo Licona                    Objective 1: (identify the means of measuring success in meeting the objective): Bringing out the best version of my self in all aspects. Objective 2: (identify the means of measuring success in meeting the objective): Healthier conversations. Better experiences with people. I am currently under the care of a [facility] psychiatric provider: yes     My [facility] psychiatric provider is: Silas Delaney am currently taking psychiatric medications: Yes, as prescribed    I feel that I will be ready for discharge from mental health care when I reach the following (measurable goal/objective): See Goal above. For children and adults who have a legal guardian:   Has there been any change to custody orders and/or guardianship status? NA. If yes, attach updated documentation. I have created my Crisis Plan and have been offered a copy of this plan    [address]: Diagnosis and Treatment Plan explained to Titi Katalinahyun acknowledges an understanding of their diagnosis.  Chalo Licona agrees to this treatment plan.     I have been offered a copy of this Treatment Plan. yes

## 2024-04-18 DIAGNOSIS — F31.9 BIPOLAR AFFECTIVE DISORDER, REMISSION STATUS UNSPECIFIED (HCC): ICD-10-CM

## 2024-04-18 RX ORDER — BUPROPION HYDROCHLORIDE 300 MG/1
300 TABLET ORAL DAILY
Qty: 30 TABLET | Refills: 0 | Status: SHIPPED | OUTPATIENT
Start: 2024-04-18

## 2024-04-18 RX ORDER — LAMOTRIGINE 100 MG/1
100 TABLET ORAL DAILY
Qty: 30 TABLET | Refills: 0 | Status: SHIPPED | OUTPATIENT
Start: 2024-04-18

## 2024-05-21 ENCOUNTER — TELEPHONE (OUTPATIENT)
Dept: PSYCHIATRY | Facility: CLINIC | Age: 34
End: 2024-05-21

## 2024-05-21 ENCOUNTER — TELEMEDICINE (OUTPATIENT)
Dept: PSYCHIATRY | Facility: CLINIC | Age: 34
End: 2024-05-21
Payer: COMMERCIAL

## 2024-05-21 DIAGNOSIS — F31.9 BIPOLAR AFFECTIVE DISORDER, REMISSION STATUS UNSPECIFIED (HCC): ICD-10-CM

## 2024-05-21 DIAGNOSIS — G47.00 INSOMNIA, UNSPECIFIED TYPE: ICD-10-CM

## 2024-05-21 PROCEDURE — 99214 OFFICE O/P EST MOD 30 MIN: CPT | Performed by: PSYCHIATRY & NEUROLOGY

## 2024-05-21 RX ORDER — BUPROPION HYDROCHLORIDE 300 MG/1
300 TABLET ORAL DAILY
Qty: 30 TABLET | Refills: 2 | Status: SHIPPED | OUTPATIENT
Start: 2024-05-21

## 2024-05-21 RX ORDER — LAMOTRIGINE 100 MG/1
100 TABLET ORAL DAILY
Qty: 30 TABLET | Refills: 2 | Status: SHIPPED | OUTPATIENT
Start: 2024-05-21

## 2024-05-21 RX ORDER — TRAZODONE HYDROCHLORIDE 100 MG/1
100 TABLET ORAL
Qty: 30 TABLET | Refills: 2 | Status: SHIPPED | OUTPATIENT
Start: 2024-05-21

## 2024-05-21 NOTE — PSYCH
"  Virtual Regular Visit    Verification of patient location:    Patient is located at Home in the following state in which I hold an active license [place]      Assessment/Plan:    Problem List Items Addressed This Visit    None      Goals addressed in session: Goal 1          Reason for visit is   Chief Complaint   Patient presents with    Medication Management        Encounter provider Aidee Dean MD      Recent Visits  No visits were found meeting these conditions.  Showing recent visits within past 7 days and meeting all other requirements  Today's Visits  Date Type Provider Dept   05/21/24 Telephone [facility]   05/21/24 Telemedicine Aidee Dean MD [facility]   Showing today's visits and meeting all other requirements  Future Appointments  No visits were found meeting these conditions.  Showing future appointments within next 150 days and meeting all other requirements       The patient was identified by name and date of birth. Kranthi Smith was informed that this is a telemedicine visit and that the visit is being conducted throughthe Epic Embedded platform. He agrees to proceed..  My office door was closed. No one else was in the room.  He acknowledged consent and understanding of privacy and security of the video platform. The patient has agreed to participate and understands they can discontinue the visit at any time.    Patient is aware this is a billable service.     Subjective  Kranthi Smith is a 33 y.o. male with bipolar do and insomnia presents for appt .  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 1-2 x month gets depressed " for 2-3 days. Denies psychotic spx  Anxiety - worries sometimes; denies panic attacks  Sleep - 5-6 hrs  History reviewed. No pertinent past medical history.    History reviewed. No pertinent surgical history.    Current Outpatient Medications   Medication Sig Dispense Refill    buPROPion (WELLBUTRIN XL) 300 mg 24 hr tablet Take 1 tablet (300 mg total) by mouth daily 30 tablet 0    lamoTRIgine (LaMICtal) 100 mg tablet Take 1 tablet (100 mg total) by mouth daily 30 tablet 0    pantoprazole (PROTONIX) 40 mg tablet Take 1 tablet (40 mg total) by mouth daily 30 tablet 1    traZODone (DESYREL) 100 mg tablet Take 1 tablet (100 mg total) by mouth daily at bedtime 30 tablet 1     No current facility-administered medications for this visit.        Not on File    Review of Systems   Constitutional:  Negative for activity change and appetite change.   Psychiatric/Behavioral:  Negative for sleep disturbance and suicidal ideas. The patient is hyperactive. The patient is not nervous/anxious.        Video Exam    There were no vitals filed for this visit.    Physical Exam  Constitutional:       Appearance: Normal appearance. He is normal weight.      Comments: Multiple tattos   Neurological:      Mental Status: He is alert.   Psychiatric:         Attention and Perception: Attention and perception normal.         Mood and Affect: Affect normal. Mood is elated.         Speech: Speech normal.         Behavior: Behavior normal. Behavior is cooperative.         Thought Content: Thought content normal.         Judgment: Judgment normal.          Visit Time    Visit Start Time: 12.57 pm   Visit Stop Time: 1.07 pm   Total Visit Duration:  23 minutes  TREATMENT PLAN (Medication Management Only)        [facility]    Name and Date of Birth:  Kranthi LOCKE Sarah 33 y.o. 1990  Date of Treatment Plan: May 21, 2024  Diagnosis/Diagnoses:  No diagnosis found.  Strengths/Personal Resources for  Self-Care: taking medications as prescribed.  Area/Areas of need (in own words): mood swings  1. Long Term Goal: decrease manic symptoms.  Target Date:6 months - 11/21/2024  Person/Persons responsible for completion of goal: Kranthi B  2.  Short Term Objective (s) - How will we reach this goal?:   A. Provider new recommended medication/dosage changes and/or continue medication(s): continue current medications as prescribed Wellbutrin XL, Trazodone, Lamictal.  B. N/A.  C. N/A.  Target Date:6 months - 11/21/2024  Person/Persons Responsible for Completion of Goal: Kranthi B  Progress Towards Goals: continuing treatment  Treatment Modality: medication management every 3 months  Review due 180 days from date of this plan: 6 months - 11/21/2024  Expected length of service: ongoing treatment  My Physician/PA/NP and I have developed this plan together and I agree to work on the goals and objectives. I understand the treatment goals that were developed for my treatment.

## 2024-05-21 NOTE — TELEPHONE ENCOUNTER
Writer called client to remind him of yearly paperwork that needed to be signed before the appointment and where to find it.     Client found the document center and will be able to sign paperwork.

## 2024-05-24 ENCOUNTER — TELEPHONE (OUTPATIENT)
Dept: PSYCHIATRY | Facility: CLINIC | Age: 34
End: 2024-05-24

## 2024-05-24 NOTE — TELEPHONE ENCOUNTER
Left voicemail informing patient and/or parent/guardian of the Psych Encounter form needing to be signed as a requirement from the insurance company for billing purposes. Patient can access form via [employer] and sign electronically.     Please make patient aware this form must be signed for each visit as a requirement to continue future visits with provider.

## 2024-08-27 ENCOUNTER — TELEMEDICINE (OUTPATIENT)
Dept: PSYCHIATRY | Facility: CLINIC | Age: 34
End: 2024-08-27
Payer: COMMERCIAL

## 2024-08-27 DIAGNOSIS — G47.00 INSOMNIA, UNSPECIFIED TYPE: ICD-10-CM

## 2024-08-27 DIAGNOSIS — F12.10 CANNABIS ABUSE: ICD-10-CM

## 2024-08-27 DIAGNOSIS — F31.9 BIPOLAR AFFECTIVE DISORDER, REMISSION STATUS UNSPECIFIED (HCC): Primary | ICD-10-CM

## 2024-08-27 PROCEDURE — 99214 OFFICE O/P EST MOD 30 MIN: CPT | Performed by: PSYCHIATRY & NEUROLOGY

## 2024-08-27 RX ORDER — LAMOTRIGINE 100 MG/1
100 TABLET ORAL DAILY
Qty: 30 TABLET | Refills: 2 | Status: SHIPPED | OUTPATIENT
Start: 2024-08-27

## 2024-08-27 RX ORDER — BUPROPION HYDROCHLORIDE 300 MG/1
300 TABLET ORAL DAILY
Qty: 30 TABLET | Refills: 2 | Status: SHIPPED | OUTPATIENT
Start: 2024-08-27

## 2024-08-27 NOTE — PSYCH
"  Virtual Regular Visit    Verification of patient location:    Patient is located at Home in the following state in which I hold an active license [place]      Assessment/Plan:    Problem List Items Addressed This Visit    None      Goals addressed in session: Goal 1          Reason for visit is   Chief Complaint   Patient presents with    Medication Management        Encounter provider Aidee Dean MD      Recent Visits  No visits were found meeting these conditions.  Showing recent visits within past 7 days and meeting all other requirements  Today's Visits  Date Type Provider Dept   08/27/24 Telemedicine Aidee Dean MD [facility]   Showing today's visits and meeting all other requirements  Future Appointments  No visits were found meeting these conditions.  Showing future appointments within next 150 days and meeting all other requirements       The patient was identified by name and date of birth. Kranthi Smith was informed that this is a telemedicine visit and that the visit is being conducted throughthe Epic Embedded platform. He agrees to proceed..  My office door was closed. No one else was in the room.  He acknowledged consent and understanding of privacy and security of the video platform. The patient has agreed to participate and understands they can discontinue the visit at any time.    Patient is aware this is a billable service.     Subjective  Kranthi Smith is a 34 y.o. adult with bipolar do and insomnia presents for regular f/u  .  He claims to have meds supply; as per chart, last refill in June  Denies SE  Recent check up - WNL  Pt keeps busy with designing T-shirts, writing music  Vaping MJ    HPI   Mood - reports as \" happy\" most of the time; sometimes gets angry , punching walls. Sometimes feels \" guilty of success\"; thinks \" friends are jealous of me\". Denies depression. Most of the time pt is creative, productive and active.  Anxiety - denies panic attacks or racing " thoughts  Sleep - 4-6 hrs  History reviewed. No pertinent past medical history.    History reviewed. No pertinent surgical history.    Current Outpatient Medications   Medication Sig Dispense Refill    buPROPion (WELLBUTRIN XL) 300 mg 24 hr tablet Take 1 tablet (300 mg total) by mouth daily 30 tablet 2    lamoTRIgine (LaMICtal) 100 mg tablet Take 1 tablet (100 mg total) by mouth daily 30 tablet 2    pantoprazole (PROTONIX) 40 mg tablet Take 1 tablet (40 mg total) by mouth daily 30 tablet 1    traZODone (DESYREL) 100 mg tablet Take 1 tablet (100 mg total) by mouth daily at bedtime 30 tablet 2     No current facility-administered medications for this visit.        Not on File    Review of Systems   Constitutional:  Negative for activity change and appetite change.   Psychiatric/Behavioral:  Negative for dysphoric mood, sleep disturbance and suicidal ideas. The patient is hyperactive. The patient is not nervous/anxious.        Video Exam    There were no vitals filed for this visit.    Physical Exam  Constitutional:       Appearance: Normal appearance. He is normal weight.   Neurological:      Mental Status: He is alert.   Psychiatric:         Attention and Perception: Perception normal. He is inattentive.         Mood and Affect: Mood normal. Affect is blunt.         Behavior: Behavior normal. Behavior is cooperative.         Thought Content: Thought content is delusional (grandiose?).         Judgment: Judgment normal.      Comments: Slightly disorganized; talaktive          Visit Time    Visit Start Time: 1.58 pm   Visit Stop Time: 2.08 pm   Total Visit Duration:  20 minutes

## 2024-11-19 ENCOUNTER — TELEMEDICINE (OUTPATIENT)
Dept: PSYCHIATRY | Facility: CLINIC | Age: 34
End: 2024-11-19
Payer: COMMERCIAL

## 2024-11-19 ENCOUNTER — TELEPHONE (OUTPATIENT)
Dept: PSYCHIATRY | Facility: CLINIC | Age: 34
End: 2024-11-19

## 2024-11-19 ENCOUNTER — TELEPHONE (OUTPATIENT)
Age: 34
End: 2024-11-19

## 2024-11-19 DIAGNOSIS — F31.9 BIPOLAR AFFECTIVE DISORDER, REMISSION STATUS UNSPECIFIED (HCC): Primary | ICD-10-CM

## 2024-11-19 DIAGNOSIS — G47.00 INSOMNIA, UNSPECIFIED TYPE: ICD-10-CM

## 2024-11-19 PROCEDURE — 99214 OFFICE O/P EST MOD 30 MIN: CPT | Performed by: PSYCHIATRY & NEUROLOGY

## 2024-11-19 RX ORDER — QUETIAPINE FUMARATE 100 MG/1
100 TABLET, FILM COATED ORAL
Qty: 30 TABLET | Refills: 1 | Status: SHIPPED | OUTPATIENT
Start: 2024-11-19

## 2024-11-19 RX ORDER — BUPROPION HYDROCHLORIDE 300 MG/1
300 TABLET ORAL DAILY
Qty: 30 TABLET | Refills: 2 | Status: SHIPPED | OUTPATIENT
Start: 2024-11-19

## 2024-11-19 RX ORDER — LAMOTRIGINE 150 MG/1
150 TABLET ORAL DAILY
Qty: 30 TABLET | Refills: 1 | Status: SHIPPED | OUTPATIENT
Start: 2024-11-19

## 2024-11-19 NOTE — BH TREATMENT PLAN
TREATMENT PLAN (Medication Management Only)        [facility]    Name and Date of Birth:  Kranthi Smith 34 y.o. 1990  Date of Treatment Plan: November 19, 2024  Diagnosis/Diagnoses:    1. Bipolar affective disorder, remission status unspecified (HCC)    2. Insomnia, unspecified type      Strengths/Personal Resources for Self-Care: supportive friends, taking medications as prescribed.  Area/Areas of need (in own words): anxiety, mood instability  1. Long Term Goal: help with mood stability.  Target Date:6 months - 5/19/2025  Person/Persons responsible for completion of goal: Kranthi B  2.  Short Term Objective (s) - How will we reach this goal?:   A. Provider new recommended medication/dosage changes and/or continue medication(s): continue current medications as prescribed Wellbutrin XL, Lamictal, Seroquel.  B. N/A.  C. N/A.  Target Date:6 months - 5/19/2025  Person/Persons Responsible for Completion of Goal: Kranthi B  Progress Towards Goals: continuing treatment  Treatment Modality: medication management every 4 weeks  Review due 180 days from date of this plan: 6 months - 5/19/2025  Expected length of service: ongoing treatment  My Physician/PA/NP and I have developed this plan together and I agree to work on the goals and objectives. I understand the treatment goals that were developed for my treatment.

## 2024-11-19 NOTE — PATIENT INSTRUCTIONS
Increase lamictal 150 mg qd  Continue wellbutrin, will consider taper down  Add seroquel 100 mg hs

## 2024-11-19 NOTE — TELEPHONE ENCOUNTER
Writer called and scheduled 4 week follow up with Dr. Dean for 12/17/24 and resent VC form for client to fill out with emergency contact.

## 2024-11-19 NOTE — PSYCH
Virtual Regular Visit    Verification of patient location:    Patient is located at Home in the following state in which I hold an active license [place]      Assessment/Plan:  Assessment & Plan  Bipolar affective disorder, remission status unspecified (HCC)    Orders:    buPROPion (WELLBUTRIN XL) 300 mg 24 hr tablet; Take 1 tablet (300 mg total) by mouth daily    lamoTRIgine (LaMICtal) 150 MG tablet; Take 1 tablet (150 mg total) by mouth daily    QUEtiapine (SEROquel) 100 mg tablet; Take 1 tablet (100 mg total) by mouth daily at bedtime    Insomnia, unspecified type    Orders:    QUEtiapine (SEROquel) 100 mg tablet; Take 1 tablet (100 mg total) by mouth daily at bedtime       Problem List Items Addressed This Visit    None  Visit Diagnoses         Bipolar affective disorder, remission status unspecified (HCC)    -  Primary      Insomnia, unspecified type                Goals addressed in session: Goal 1          Reason for visit is   Chief Complaint   Patient presents with    Medication Management        Encounter provider Aidee Dean MD      Recent Visits  No visits were found meeting these conditions.  Showing recent visits within past 7 days and meeting all other requirements  Today's Visits  Date Type Provider Dept   11/19/24 Telemedicine Aidee Dean MD [facility]   Showing today's visits and meeting all other requirements  Future Appointments  No visits were found meeting these conditions.  Showing future appointments within next 150 days and meeting all other requirements       The patient was identified by name and date of birth. Kranthi Smith was informed that this is a telemedicine visit and that the visit is being conducted throughthe Epic Embedded platform. He agrees to proceed..  My office door was closed. No one else was in the room.  He acknowledged consent and understanding of privacy and security of the video platform. The patient has agreed to participate and understands they can  "discontinue the visit at any time.    Patient is aware this is a billable service.     Feng LOCKE Roscoe is a 34 y.o. adult with bipolar do and insomnia presents for regular f/u  .  Compliant with meds, denies SE  I reviewed the chart  Pt stopped smoking weed a month ago  He designs T-shirts; writes music    HPI   Mood - c/o mood swings - 1-2 weeks of feeling \" the best\" with increased energy ( 12-14 hrs of working) and creativity; elevated mood, FOI - followed by 1-2 weeks of feeling \" down\"; gets frustrated and angry ( verbal outbursts, punching walls sometimes); low motivation and low energy' anhedonia and eath wishes sometimes  Anxiety - racing thoughts all the time; denies panic attacks  Sleep - poor after he stopped weed; 4-5 hrs; takes melatonin prn  Prior trials of trazodone - \" too strong\" and seroquel -\" ok\"   History reviewed. No pertinent past medical history.    History reviewed. No pertinent surgical history.    Current Outpatient Medications   Medication Sig Dispense Refill    buPROPion (WELLBUTRIN XL) 300 mg 24 hr tablet Take 1 tablet (300 mg total) by mouth daily 30 tablet 2    lamoTRIgine (LaMICtal) 100 mg tablet Take 1 tablet (100 mg total) by mouth daily 30 tablet 2    pantoprazole (PROTONIX) 40 mg tablet Take 1 tablet (40 mg total) by mouth daily 30 tablet 1     No current facility-administered medications for this visit.        Not on File    Review of Systems   Constitutional:  Negative for activity change and appetite change.   Psychiatric/Behavioral:  Positive for dysphoric mood and sleep disturbance. Negative for suicidal ideas. The patient is nervous/anxious.         Mood swings         Video Exam    There were no vitals filed for this visit.    Physical Exam  Constitutional:       Appearance: Normal appearance. He is normal weight.   Neurological:      Mental Status: He is alert.   Psychiatric:         Attention and Perception: Perception normal. He is inattentive.         " Mood and Affect: Mood is anxious and depressed. Affect is blunt.         Behavior: Behavior normal. Behavior is cooperative.         Thought Content: Thought content normal.         Judgment: Judgment normal.      Comments: Talkative, slightly pressured speech          Visit Time  Face to face  Visit Start Time: 1.30 pm   Visit Stop Time: 1.43 pm   Total Visit Duration:  25 minutes total spent in patient care

## 2024-11-19 NOTE — TELEPHONE ENCOUNTER
PA for QUEtiapine Fumarate 100MG tablets  SUBMITTED to [employer]    via    [x]CMM-KEY: [id number]  []Surescripts-Case ID #   []Availity-Auth ID # NDC #   []Faxed to plan   []Other website   []Phone call Case ID #     []PA sent as URGENT    All office notes, labs and other pertaining documents and studies sent. Clinical questions answered. Awaiting determination from insurance company.     Turnaround time for your insurance to make a decision on your Prior Authorization can take 7-21 business days.

## 2024-11-20 NOTE — TELEPHONE ENCOUNTER
PA for QUEtiapine Fumarate 100MG tablets NOT REQUIRED     Reason (screenshot if applicable)          Patient advised by          [] MyChart Message  [] Phone call   []LMOM  []L/M to call office as no active Communication consent on file  []Unable to leave detailed message as VM not approved on Communication consent       Pharmacy advised by    []Fax  []Phone call

## 2024-11-22 ENCOUNTER — TELEPHONE (OUTPATIENT)
Dept: PSYCHIATRY | Facility: CLINIC | Age: 34
End: 2024-11-22

## 2024-11-22 NOTE — TELEPHONE ENCOUNTER
Left voicemail informing patient and/or parent/guardian of the Psych Encounter form needing to be signed as a requirement from the insurance company for billing purposes. Patient can access form via OANDA and sign electronically.     Please make patient aware this form must be signed for each visit as a requirement to continue future visits with provider.    Virtual Encounter form 11/19/24

## 2024-12-02 ENCOUNTER — TELEPHONE (OUTPATIENT)
Dept: PSYCHIATRY | Facility: CLINIC | Age: 34
End: 2024-12-02

## 2024-12-02 NOTE — TELEPHONE ENCOUNTER
Left voicemail informing patient and/or parent/guardian of the Psych Encounter form needing to be signed as a requirement from the insurance company for billing purposes. Patient can access form via New KCBX and sign electronically.     Please make patient aware this form must be signed for each visit as a requirement to continue future visits with provider.

## 2024-12-17 ENCOUNTER — TELEMEDICINE (OUTPATIENT)
Dept: PSYCHIATRY | Facility: CLINIC | Age: 34
End: 2024-12-17
Payer: COMMERCIAL

## 2024-12-17 ENCOUNTER — TELEPHONE (OUTPATIENT)
Dept: PSYCHIATRY | Facility: CLINIC | Age: 34
End: 2024-12-17

## 2024-12-17 DIAGNOSIS — G47.00 INSOMNIA, UNSPECIFIED TYPE: ICD-10-CM

## 2024-12-17 DIAGNOSIS — F31.9 BIPOLAR AFFECTIVE DISORDER, REMISSION STATUS UNSPECIFIED (HCC): Primary | ICD-10-CM

## 2024-12-17 PROCEDURE — 99214 OFFICE O/P EST MOD 30 MIN: CPT | Performed by: PSYCHIATRY & NEUROLOGY

## 2024-12-17 RX ORDER — BUPROPION HYDROCHLORIDE 300 MG/1
300 TABLET ORAL DAILY
Qty: 30 TABLET | Refills: 1 | Status: SHIPPED | OUTPATIENT
Start: 2024-12-17

## 2024-12-17 RX ORDER — LAMOTRIGINE 150 MG/1
150 TABLET ORAL DAILY
Qty: 30 TABLET | Refills: 1 | Status: SHIPPED | OUTPATIENT
Start: 2024-12-17

## 2024-12-17 RX ORDER — ARIPIPRAZOLE 5 MG/1
5 TABLET ORAL DAILY
Qty: 30 TABLET | Refills: 1 | Status: SHIPPED | OUTPATIENT
Start: 2024-12-17

## 2024-12-17 NOTE — TELEPHONE ENCOUNTER
Writer called and scheduled 4 week (ended up 5) follow up with Dr. Dean and informed client of ER contact on  and [facility] NP forms.

## 2024-12-17 NOTE — PSYCH
Virtual Regular Visit    Verification of patient location:    Patient is located at Home in the following state in which I hold an active license [place]      Assessment/Plan:  Assessment & Plan  Bipolar affective disorder, remission status unspecified (HCC)    Orders:    lamoTRIgine (LaMICtal) 150 MG tablet; Take 1 tablet (150 mg total) by mouth daily    buPROPion (WELLBUTRIN XL) 300 mg 24 hr tablet; Take 1 tablet (300 mg total) by mouth daily    ARIPiprazole (ABILIFY) 5 mg tablet; Take 1 tablet (5 mg total) by mouth daily    Insomnia, unspecified type  Continue current meds          Problem List Items Addressed This Visit    None  Visit Diagnoses         Bipolar affective disorder, remission status unspecified (HCC)    -  Primary      Insomnia, unspecified type                Goals addressed in session: Goal 1     Depression Follow-up Plan Completed: Not applicable    Reason for visit is   Chief Complaint   Patient presents with    Medication Management        Encounter provider Aidee Dean MD      Recent Visits  No visits were found meeting these conditions.  Showing recent visits within past 7 days and meeting all other requirements  Today's Visits  Date Type Provider Dept   12/17/24 Telemedicine Aidee Dean MD [facility]   Showing today's visits and meeting all other requirements  Future Appointments  No visits were found meeting these conditions.  Showing future appointments within next 150 days and meeting all other requirements       The patient was identified by name and date of birth. Kranthi LOCKE Sarah was informed that this is a telemedicine visit and that the visit is being conducted throughthe Epic Embedded platform. He agrees to proceed..  My office door was closed. No one else was in the room.  He acknowledged consent and understanding of privacy and security of the video platform. The patient has agreed to participate and understands they can discontinue the visit at any time.  Phone was  "used b/o poor audio connection  Patient is aware this is a billable service.     Feng LOCKE Sarah is a 34 y.o. adult with bipolar do and insomnia presents for f/u  .  He stopped seroquel several days ago, c/o SE increased sleep; sedated, groggy  He continued lamictal and wellbutrin  Pt claims not smoking weed  Still keeps busy with business and music  I reviewed the chart  HPI   Mood - pt continues to c/o \" fast \"moo changes with irritability and anger; gets frustrated sometimes; denies anger outbursts  Episodes of getting sad, concerned about relationship with GF   Feels better when busy; does ADLs  Anxiety - sometimes racing thoughts; denies panic attacks  Sleep - 8-9 hrs  History reviewed. No pertinent past medical history.    History reviewed. No pertinent surgical history.    Current Outpatient Medications   Medication Sig Dispense Refill    buPROPion (WELLBUTRIN XL) 300 mg 24 hr tablet Take 1 tablet (300 mg total) by mouth daily 30 tablet 2    lamoTRIgine (LaMICtal) 150 MG tablet Take 1 tablet (150 mg total) by mouth daily 30 tablet 1    pantoprazole (PROTONIX) 40 mg tablet Take 1 tablet (40 mg total) by mouth daily 30 tablet 1    QUEtiapine (SEROquel) 100 mg tablet Take 1 tablet (100 mg total) by mouth daily at bedtime 30 tablet 1     No current facility-administered medications for this visit.        Not on File    Review of Systems   Constitutional:  Negative for activity change and appetite change.   Psychiatric/Behavioral:  Positive for dysphoric mood. Negative for sleep disturbance and suicidal ideas. The patient is nervous/anxious.        Video Exam    There were no vitals filed for this visit.    Physical Exam  Constitutional:       Appearance: Normal appearance. He is normal weight.   Neurological:      Mental Status: He is alert.   Psychiatric:         Attention and Perception: Attention and perception normal.         Mood and Affect: Mood is depressed. Affect is blunt.         Speech: " Speech normal.         Behavior: Behavior normal. Behavior is cooperative.         Thought Content: Thought content normal.         Judgment: Judgment normal.          Visit Time  Face to face  Visit Start Time: 2.00 pm   Visit Stop Time: 2.10 pm   Total Visit Duration:  20 minutes total spent in patient care

## 2024-12-20 ENCOUNTER — TELEPHONE (OUTPATIENT)
Dept: PSYCHIATRY | Facility: CLINIC | Age: 34
End: 2024-12-20

## 2024-12-20 NOTE — TELEPHONE ENCOUNTER
Left voicemail informing patient and/or parent/guardian of the Psych Encounter form needing to be signed as a requirement from the insurance company for billing purposes. Patient can access form via RecordSled and sign electronically.     Please make patient aware this form must be signed for each visit as a requirement to continue future visits with provider.    Virtual Encounter form 12/17/24 call #[phone]

## 2024-12-27 ENCOUNTER — TELEPHONE (OUTPATIENT)
Dept: PSYCHIATRY | Facility: CLINIC | Age: 34
End: 2024-12-27

## 2024-12-27 NOTE — TELEPHONE ENCOUNTER
Left voicemail informing patient and/or parent/guardian of the Psych Encounter form needing to be signed as a requirement from the insurance company for billing purposes. Patient can access form via Jamglue and sign electronically.     Please make patient aware this form must be signed for each visit as a requirement to continue future visits with provider.

## 2025-01-09 ENCOUNTER — TELEPHONE (OUTPATIENT)
Dept: PSYCHIATRY | Facility: CLINIC | Age: 35
End: 2025-01-09

## 2025-01-09 NOTE — TELEPHONE ENCOUNTER
Left voicemail informing patient and/or parent/guardian of the Psych Encounter form needing to be signed as a requirement from the insurance company for billing purposes. Patient can access form via Targeter App and sign electronically.     Please make patient aware this form must be signed for each visit as a requirement to continue future visits with provider.

## 2025-01-23 ENCOUNTER — TELEPHONE (OUTPATIENT)
Dept: PSYCHIATRY | Facility: CLINIC | Age: 35
End: 2025-01-23

## 2025-01-23 NOTE — TELEPHONE ENCOUNTER
Left voicemail informing patient and/or parent/guardian of the Psych Encounter form needing to be signed as a requirement from the insurance company for billing purposes. Patient can access form via Aptito and sign electronically.     Please make patient aware this form must be signed for each visit as a requirement to continue future visits with provider.    Virtual encounter from 12/17/25  call #[phone]

## 2025-03-05 DIAGNOSIS — Z79.899 ENCOUNTER FOR LONG-TERM (CURRENT) USE OF MEDICATIONS: Primary | ICD-10-CM

## 2025-04-18 ENCOUNTER — TELEPHONE (OUTPATIENT)
Dept: PSYCHIATRY | Facility: CLINIC | Age: 35
End: 2025-04-18

## 2025-05-29 ENCOUNTER — TELEPHONE (OUTPATIENT)
Dept: PSYCHIATRY | Facility: CLINIC | Age: 35
End: 2025-05-29

## 2025-05-29 ENCOUNTER — DOCUMENTATION (OUTPATIENT)
Dept: PSYCHIATRY | Facility: CLINIC | Age: 35
End: 2025-05-29

## 2025-05-29 NOTE — PSYCH
Psychiatric Discharge Summary   Discharge Summary: Routine   Discharge Diagnosis:No diagnosis found.  Treating Physician: , Treatment Complications: poor compliance, Admit with discharge:   Prognosis at time of discharge: Fair  Presenting Problems/Pertinent Findings: bipolar I do, insomnia, cannabis use   Therapist:   Course of Treatment: Medication Management  Summary of Treatment Progress: poor compliance; stopped f/u     To what extent did the consumer achieve their goals? Some  Criteria for Discharge: Have demostrated failure to uphold their treatment plan/contract  Aftercare Recommendations:   Discharge Medications: Current Medications[1]     Describe consumers ability and willingness to work and solve his mental problem.       Substance Abuse History:  Social History     Substance and Sexual Activity   Drug Use Not on file       Family Psychiatric History: Family History[2]        Social History     Socioeconomic History    Marital status: Single     Spouse name: Not on file    Number of children: Not on file    Years of education: Not on file    Highest education level: Not on file   Occupational History    Not on file   Tobacco Use    Smoking status: Not on file    Smokeless tobacco: Not on file   Substance and Sexual Activity    Alcohol use: Not on file    Drug use: Not on file    Sexual activity: Not on file   Other Topics Concern    Not on file   Social History Narrative    Not on file     Social Drivers of Health     Financial Resource Strain: Not At Risk (6/20/2023)    Received from [facility]    Financial Resource Strain     In the last 12 months did you skip medications to save money?: No     In the last 12 months, was there a time when you needed to see a doctor but could not because of cost?: No   Food Insecurity: Not At Risk (6/20/2023)    Received from [facility]    Food Insecurity     In the last 12 months did you ever eat less than you  felt you should because there wasn't enough money for food?: No   Transportation Needs: Not At Risk (6/20/2023)    Received from [facility]    Transporation     In the last 12 months, have you ever had to go without healthcare because you didn't have a way to get there?: No   Physical Activity: Not on file   Stress: Not on file   Social Connections: At Risk (6/20/2023)    Received from [facility]    Social Connections     Do you often feel lonely?: Yes   Intimate Partner Violence: Not on file   Housing Stability: Not At Risk (6/20/2023)    Received from [facility]    Housing Stability     Are you worried that in the next 2 months you may not have stable housing?: No     Social History     Social History Narrative    Not on file       Mental Status at Time of most recent visit on please see note 12/17/24.            [1]   Current Outpatient Medications:     ARIPiprazole (ABILIFY) 5 mg tablet, Take 1 tablet (5 mg total) by mouth daily, Disp: 30 tablet, Rfl: 1    buPROPion (WELLBUTRIN XL) 300 mg 24 hr tablet, Take 1 tablet (300 mg total) by mouth daily, Disp: 30 tablet, Rfl: 1    lamoTRIgine (LaMICtal) 150 MG tablet, Take 1 tablet (150 mg total) by mouth daily, Disp: 30 tablet, Rfl: 1    pantoprazole (PROTONIX) 40 mg tablet, Take 1 tablet (40 mg total) by mouth daily, Disp: 30 tablet, Rfl: 1  [2] No family history on file.

## 2025-06-23 ENCOUNTER — TELEPHONE (OUTPATIENT)
Age: 35
End: 2025-06-23

## 2025-06-23 NOTE — TELEPHONE ENCOUNTER
Pt called in to schedule with Aidee Dean. Pt is schedule for VV on 6/24/25 @ 1:30 Pt was also added to wait list for talk therapy

## 2025-06-24 NOTE — TELEPHONE ENCOUNTER
Writer called client and left voicemail to notify him that unfortunately, today's appointment will have to be cancelled and client will have to go back through intake. Client was discharged from Medication Management in May after non-response to follow up letter sent in April and other earlier attempts to schedule follow up via phone call.    Client advised to call back to reschedule as a New Patient.     Client has MA and was grandfathered into seeing Dr. Dean virtually, please be advised he may not be able to reschedule virtual due to insurance. Writer will forward note to managers and supervisors for input to give to intake.

## 2025-07-12 DIAGNOSIS — F31.9 BIPOLAR AFFECTIVE DISORDER, REMISSION STATUS UNSPECIFIED (HCC): ICD-10-CM

## 2025-07-13 DIAGNOSIS — F31.9 BIPOLAR AFFECTIVE DISORDER, REMISSION STATUS UNSPECIFIED (HCC): ICD-10-CM

## 2025-07-14 RX ORDER — LAMOTRIGINE 150 MG/1
150 TABLET ORAL DAILY
Qty: 30 TABLET | Refills: 1 | OUTPATIENT
Start: 2025-07-14

## 2025-07-15 RX ORDER — BUPROPION HYDROCHLORIDE 300 MG/1
300 TABLET ORAL DAILY
Qty: 30 TABLET | Refills: 1 | OUTPATIENT
Start: 2025-07-15

## 2025-08-01 ENCOUNTER — TELEPHONE (OUTPATIENT)
Age: 35
End: 2025-08-01